# Patient Record
Sex: MALE | Race: WHITE | NOT HISPANIC OR LATINO | URBAN - METROPOLITAN AREA
[De-identification: names, ages, dates, MRNs, and addresses within clinical notes are randomized per-mention and may not be internally consistent; named-entity substitution may affect disease eponyms.]

---
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
## 2023-06-23 NOTE — PROCEDURE: MEDICATION COUNSELING
28 year old male with no significant PMH presents with cc of rash on the back of his shoulder likely secondary to a tick bite. No neck pain or stiffness. Detail Level: Simple